# Patient Record
Sex: FEMALE | Race: WHITE | NOT HISPANIC OR LATINO | URBAN - METROPOLITAN AREA
[De-identification: names, ages, dates, MRNs, and addresses within clinical notes are randomized per-mention and may not be internally consistent; named-entity substitution may affect disease eponyms.]

---

## 2021-08-28 ENCOUNTER — TELEPHONE ENCOUNTER (OUTPATIENT)
Dept: URBAN - METROPOLITAN AREA CLINIC 13 | Facility: CLINIC | Age: 66
End: 2021-08-28

## 2021-08-29 ENCOUNTER — TELEPHONE ENCOUNTER (OUTPATIENT)
Dept: URBAN - METROPOLITAN AREA CLINIC 13 | Facility: CLINIC | Age: 66
End: 2021-08-29

## 2023-07-05 ENCOUNTER — TELEPHONE ENCOUNTER (OUTPATIENT)
Dept: URBAN - METROPOLITAN AREA CLINIC 46 | Facility: CLINIC | Age: 68
End: 2023-07-05

## 2023-07-05 ENCOUNTER — DASHBOARD ENCOUNTERS (OUTPATIENT)
Age: 68
End: 2023-07-05

## 2023-07-05 VITALS — BODY MASS INDEX: 25.16 KG/M2 | WEIGHT: 142 LBS | HEIGHT: 63 IN

## 2023-07-05 RX ORDER — ESCITALOPRAM 20 MG/1
1 TABLET TABLET, FILM COATED ORAL TWICE A DAY
Refills: 0 | Status: ACTIVE | COMMUNITY

## 2023-07-05 RX ORDER — DICYCLOMINE HYDROCHLORIDE 10 MG/ML
TAKE 10 MILLILITERS (20 MG) BY ORAL ROUTE 4 TIMES PER DAY SYRUP ORAL
Qty: 0 | Refills: 0 | Status: DISCONTINUED | COMMUNITY
Start: 1900-01-01

## 2023-07-05 RX ORDER — EZETIMIBE 10 MG/1
1 TABLET TABLET ORAL ONCE A DAY
Refills: 2 | Status: ACTIVE | COMMUNITY

## 2023-07-05 RX ORDER — NEBIVOLOL HYDROCHLORIDE 5 MG/1
1 TABLET TABLET ORAL ONCE A DAY
Refills: 0 | Status: ACTIVE | COMMUNITY
Start: 1900-01-01

## 2023-07-05 RX ORDER — ASPIRIN 81 MG/1
1 TABLET TABLET, CHEWABLE ORAL ONCE A DAY
Refills: 0 | Status: ACTIVE | COMMUNITY
Start: 1900-01-01

## 2023-07-05 RX ORDER — TRAZODONE HYDROCHLORIDE 100 MG/1
1 TABLET AT BEDTIME TABLET ORAL ONCE A DAY
Qty: 90 TABLET | Status: ACTIVE | COMMUNITY

## 2023-07-05 RX ORDER — LOSARTAN POTASSIUM 50 MG/1
1 TABLET TABLET ORAL ONCE A DAY
Refills: 0 | Status: ACTIVE | COMMUNITY
Start: 1900-01-01

## 2023-07-05 RX ORDER — HYDROCHLOROTHIAZIDE 25 MG/1
1 TABLET IN THE MORNING TABLET ORAL ONCE A DAY
Refills: 0 | Status: ACTIVE | COMMUNITY
Start: 1900-01-01

## 2023-07-05 RX ORDER — LEVOTHYROXINE SODIUM 25 UG/1
1 TABLET IN THE MORNING ON AN EMPTY STOMACH TABLET ORAL ONCE A DAY
Refills: 0 | Status: ACTIVE | COMMUNITY
Start: 1900-01-01

## 2023-07-10 ENCOUNTER — LAB OUTSIDE AN ENCOUNTER (OUTPATIENT)
Dept: URBAN - METROPOLITAN AREA CLINIC 46 | Facility: CLINIC | Age: 68
End: 2023-07-10

## 2023-07-18 ENCOUNTER — OFFICE VISIT (OUTPATIENT)
Dept: URBAN - METROPOLITAN AREA CLINIC 44 | Facility: CLINIC | Age: 68
End: 2023-07-18

## 2023-08-10 ENCOUNTER — OFFICE VISIT (OUTPATIENT)
Dept: URBAN - METROPOLITAN AREA CLINIC 118 | Facility: CLINIC | Age: 68
End: 2023-08-10

## 2023-08-10 ENCOUNTER — OFFICE VISIT (OUTPATIENT)
Dept: URBAN - METROPOLITAN AREA SURGERY CENTER 27 | Facility: SURGERY CENTER | Age: 68
End: 2023-08-10

## 2023-08-24 ENCOUNTER — OFFICE VISIT (OUTPATIENT)
Dept: URBAN - METROPOLITAN AREA SURGERY CENTER 27 | Facility: SURGERY CENTER | Age: 68
End: 2023-08-24
Payer: MEDICARE

## 2023-08-24 ENCOUNTER — CLAIMS CREATED FROM THE CLAIM WINDOW (OUTPATIENT)
Dept: URBAN - METROPOLITAN AREA CLINIC 4 | Facility: CLINIC | Age: 68
End: 2023-08-24
Payer: MEDICARE

## 2023-08-24 DIAGNOSIS — Z86.010 ADENOMAS PERSONAL HISTORY OF COLONIC POLYPS: ICD-10-CM

## 2023-08-24 DIAGNOSIS — K63.89 OTHER SPECIFIED DISEASES OF INTESTINE: ICD-10-CM

## 2023-08-24 DIAGNOSIS — K57.30 DIVERTICULA OF COLON: ICD-10-CM

## 2023-08-24 DIAGNOSIS — Z12.11 COLON CANCER SCREENING (HIGH RISK): ICD-10-CM

## 2023-08-24 DIAGNOSIS — K63.5 BENIGN COLON POLYP: ICD-10-CM

## 2023-08-24 DIAGNOSIS — K63.5 BENIGN COLONIC POLYP: ICD-10-CM

## 2023-08-24 PROCEDURE — G8907 PT DOC NO EVENTS ON DISCHARG: HCPCS | Performed by: INTERNAL MEDICINE

## 2023-08-24 PROCEDURE — 88305 TISSUE EXAM BY PATHOLOGIST: CPT | Performed by: PATHOLOGY

## 2023-08-24 PROCEDURE — 45380 COLONOSCOPY AND BIOPSY: CPT | Performed by: INTERNAL MEDICINE

## 2023-08-24 PROCEDURE — 00811 ANES LWR INTST NDSC NOS: CPT | Performed by: ANESTHESIOLOGY

## 2023-08-24 RX ORDER — ASPIRIN 81 MG/1
1 TABLET TABLET, CHEWABLE ORAL ONCE A DAY
Refills: 0 | Status: ACTIVE | COMMUNITY
Start: 1900-01-01

## 2023-08-24 RX ORDER — NEBIVOLOL HYDROCHLORIDE 5 MG/1
1 TABLET TABLET ORAL ONCE A DAY
Refills: 0 | Status: ACTIVE | COMMUNITY
Start: 1900-01-01

## 2023-08-24 RX ORDER — ESCITALOPRAM 20 MG/1
1 TABLET TABLET, FILM COATED ORAL TWICE A DAY
Refills: 0 | Status: ACTIVE | COMMUNITY

## 2023-08-24 RX ORDER — LOSARTAN POTASSIUM 50 MG/1
1 TABLET TABLET ORAL ONCE A DAY
Refills: 0 | Status: ACTIVE | COMMUNITY
Start: 1900-01-01

## 2023-08-24 RX ORDER — HYDROCHLOROTHIAZIDE 25 MG/1
1 TABLET IN THE MORNING TABLET ORAL ONCE A DAY
Refills: 0 | Status: ACTIVE | COMMUNITY
Start: 1900-01-01

## 2023-08-24 RX ORDER — TRAZODONE HYDROCHLORIDE 100 MG/1
1 TABLET AT BEDTIME TABLET ORAL ONCE A DAY
Qty: 90 TABLET | Status: ACTIVE | COMMUNITY

## 2023-08-24 RX ORDER — LEVOTHYROXINE SODIUM 25 UG/1
1 TABLET IN THE MORNING ON AN EMPTY STOMACH TABLET ORAL ONCE A DAY
Refills: 0 | Status: ACTIVE | COMMUNITY
Start: 1900-01-01

## 2023-08-24 RX ORDER — EZETIMIBE 10 MG/1
1 TABLET TABLET ORAL ONCE A DAY
Refills: 2 | Status: ACTIVE | COMMUNITY

## 2024-09-17 ENCOUNTER — OFFICE VISIT (OUTPATIENT)
Dept: URBAN - METROPOLITAN AREA CLINIC 48 | Facility: CLINIC | Age: 69
End: 2024-09-17
Payer: MEDICARE

## 2024-09-17 VITALS
WEIGHT: 123.4 LBS | HEIGHT: 63 IN | SYSTOLIC BLOOD PRESSURE: 127 MMHG | TEMPERATURE: 98.2 F | DIASTOLIC BLOOD PRESSURE: 89 MMHG | BODY MASS INDEX: 21.86 KG/M2 | HEART RATE: 87 BPM

## 2024-09-17 DIAGNOSIS — K59.04 CHRONIC IDIOPATHIC CONSTIPATION: ICD-10-CM

## 2024-09-17 PROBLEM — 82934008: Status: ACTIVE | Noted: 2024-09-17

## 2024-09-17 PROCEDURE — 99214 OFFICE O/P EST MOD 30 MIN: CPT

## 2024-09-17 RX ORDER — NEBIVOLOL HYDROCHLORIDE 5 MG/1
1 TABLET TABLET ORAL ONCE A DAY
Refills: 0 | Status: ACTIVE | COMMUNITY
Start: 1900-01-01

## 2024-09-17 RX ORDER — LINACLOTIDE 72 UG/1
1 CAPSULE AT LEAST 30 MINUTES BEFORE THE FIRST MEAL OF THE DAY ON AN EMPTY STOMACH CAPSULE, GELATIN COATED ORAL ONCE A DAY
Qty: 30 | Refills: 3 | OUTPATIENT
Start: 2024-09-17 | End: 2025-01-14

## 2024-09-17 RX ORDER — ASPIRIN 81 MG/1
1 TABLET TABLET, CHEWABLE ORAL ONCE A DAY
Refills: 0 | Status: ACTIVE | COMMUNITY
Start: 1900-01-01

## 2024-09-17 RX ORDER — LEVOTHYROXINE SODIUM 75 UG/1
1 TABLET IN THE MORNING ON AN EMPTY STOMACH TABLET ORAL ONCE A DAY
Refills: 0 | Status: ACTIVE | COMMUNITY
Start: 1900-01-01

## 2024-09-17 RX ORDER — HYDROCHLOROTHIAZIDE 12.5 MG/1
1 TABLET IN THE MORNING TABLET ORAL ONCE A DAY
Refills: 0 | Status: ACTIVE | COMMUNITY
Start: 1900-01-01

## 2024-09-17 NOTE — HPI-TODAY'S VISIT:
69-year-old female presents for follow-up colitis.  Patient presented to ED 9/10/2024 for abdominal pain. Labs in ED were unremarkable, CT abdomen pelvis showed mild wall thickening of large bowel with trace inflammatory changes adjacent to cecum and sigmoid colon possibly representing colitis. She was discharged on Cipro/Flagyl. Patient reports she took the antibiotics but stated the flagyl caused lightheadedness and fatigue, so she discontinued this medication. She continues taking the cipro (day 7/10). Today, she states her abdominal pain is improving on the antibiotics. She also has longstanding history of chronic constipation x 1 year, with BM every 3-4 days and significant straining and abdominal bloating/discomfort, alleviated with BM. Prior to ED visit, she had a bad episode of constipation and thought she may have "blown a tube" from straining. Patient has been taking miralax daily without improvement in her constipation. She denies N/V, rectal bleeding, fever. No family hx CRC, polyps. She takes aspirin 81mg daily, but no other NSAID use. Colonoscopy 8/20/2023 with internal hemorrhoids, diverticulosis, removal of hyperplastic polyp.  Recommended repeat colon in 5 years.

## 2024-09-20 ENCOUNTER — TELEPHONE ENCOUNTER (OUTPATIENT)
Dept: URBAN - METROPOLITAN AREA CLINIC 44 | Facility: CLINIC | Age: 69
End: 2024-09-20

## 2024-09-20 RX ORDER — LINACLOTIDE 72 UG/1
1 CAPSULE AT LEAST 30 MINUTES BEFORE THE FIRST MEAL OF THE DAY ON AN EMPTY STOMACH CAPSULE, GELATIN COATED ORAL ONCE A DAY
Qty: 30 | Refills: 3
Start: 2024-09-17 | End: 2025-01-18

## 2024-10-01 ENCOUNTER — OFFICE VISIT (OUTPATIENT)
Dept: URBAN - METROPOLITAN AREA CLINIC 48 | Facility: CLINIC | Age: 69
End: 2024-10-01

## 2024-12-17 ENCOUNTER — OFFICE VISIT (OUTPATIENT)
Dept: URBAN - METROPOLITAN AREA CLINIC 48 | Facility: CLINIC | Age: 69
End: 2024-12-17

## 2024-12-17 RX ORDER — LEVOTHYROXINE SODIUM 75 UG/1
1 TABLET IN THE MORNING ON AN EMPTY STOMACH TABLET ORAL ONCE A DAY
Refills: 0 | Status: ACTIVE | COMMUNITY
Start: 1900-01-01

## 2024-12-17 RX ORDER — ASPIRIN 81 MG/1
1 TABLET TABLET, CHEWABLE ORAL ONCE A DAY
Refills: 0 | Status: ACTIVE | COMMUNITY
Start: 1900-01-01

## 2024-12-17 RX ORDER — LINACLOTIDE 72 UG/1
1 CAPSULE AT LEAST 30 MINUTES BEFORE THE FIRST MEAL OF THE DAY ON AN EMPTY STOMACH CAPSULE, GELATIN COATED ORAL ONCE A DAY
Qty: 30 | Refills: 3 | Status: ACTIVE | COMMUNITY
Start: 2024-09-17 | End: 2025-01-18

## 2024-12-17 RX ORDER — NEBIVOLOL HYDROCHLORIDE 5 MG/1
1 TABLET TABLET ORAL ONCE A DAY
Refills: 0 | Status: ACTIVE | COMMUNITY
Start: 1900-01-01

## 2024-12-17 RX ORDER — HYDROCHLOROTHIAZIDE 12.5 MG/1
1 TABLET IN THE MORNING TABLET ORAL ONCE A DAY
Refills: 0 | Status: ACTIVE | COMMUNITY
Start: 1900-01-01